# Patient Record
Sex: FEMALE | Race: ASIAN | Employment: FULL TIME | URBAN - METROPOLITAN AREA
[De-identification: names, ages, dates, MRNs, and addresses within clinical notes are randomized per-mention and may not be internally consistent; named-entity substitution may affect disease eponyms.]

---

## 2021-07-01 ENCOUNTER — ANESTHESIA EVENT (OUTPATIENT)
Dept: ENDOSCOPY | Age: 56
End: 2021-07-01
Payer: COMMERCIAL

## 2021-07-01 RX ORDER — FENTANYL CITRATE 50 UG/ML
25 INJECTION, SOLUTION INTRAMUSCULAR; INTRAVENOUS
Status: CANCELLED | OUTPATIENT
Start: 2021-07-01

## 2021-07-01 RX ORDER — OXYCODONE AND ACETAMINOPHEN 5; 325 MG/1; MG/1
1 TABLET ORAL AS NEEDED
Status: CANCELLED | OUTPATIENT
Start: 2021-07-01

## 2021-07-01 RX ORDER — DEXTROSE 50 % IN WATER (D50W) INTRAVENOUS SYRINGE
25-50 AS NEEDED
Status: CANCELLED | OUTPATIENT
Start: 2021-07-01

## 2021-07-01 RX ORDER — HYDROMORPHONE HYDROCHLORIDE 2 MG/ML
0.5 INJECTION, SOLUTION INTRAMUSCULAR; INTRAVENOUS; SUBCUTANEOUS
Status: CANCELLED | OUTPATIENT
Start: 2021-07-01

## 2021-07-01 RX ORDER — INSULIN LISPRO 100 [IU]/ML
INJECTION, SOLUTION INTRAVENOUS; SUBCUTANEOUS ONCE
Status: CANCELLED | OUTPATIENT
Start: 2021-07-01 | End: 2021-07-01

## 2021-07-01 RX ORDER — ONDANSETRON 2 MG/ML
4 INJECTION INTRAMUSCULAR; INTRAVENOUS ONCE
Status: CANCELLED | OUTPATIENT
Start: 2021-07-01 | End: 2021-07-01

## 2021-07-01 RX ORDER — MAGNESIUM SULFATE 100 %
4 CRYSTALS MISCELLANEOUS AS NEEDED
Status: CANCELLED | OUTPATIENT
Start: 2021-07-01

## 2021-07-01 RX ORDER — NALOXONE HYDROCHLORIDE 0.4 MG/ML
0.1 INJECTION, SOLUTION INTRAMUSCULAR; INTRAVENOUS; SUBCUTANEOUS
Status: CANCELLED | OUTPATIENT
Start: 2021-07-01

## 2021-07-01 RX ORDER — SODIUM CHLORIDE, SODIUM LACTATE, POTASSIUM CHLORIDE, CALCIUM CHLORIDE 600; 310; 30; 20 MG/100ML; MG/100ML; MG/100ML; MG/100ML
50 INJECTION, SOLUTION INTRAVENOUS CONTINUOUS
Status: CANCELLED | OUTPATIENT
Start: 2021-07-01

## 2021-07-02 ENCOUNTER — HOSPITAL ENCOUNTER (OUTPATIENT)
Age: 56
Setting detail: OUTPATIENT SURGERY
Discharge: HOME | End: 2021-07-02
Attending: INTERNAL MEDICINE | Admitting: INTERNAL MEDICINE
Payer: COMMERCIAL

## 2021-07-02 ENCOUNTER — ANESTHESIA (OUTPATIENT)
Dept: ENDOSCOPY | Age: 56
End: 2021-07-02
Payer: COMMERCIAL

## 2021-07-02 VITALS
HEIGHT: 63 IN | TEMPERATURE: 97.3 F | SYSTOLIC BLOOD PRESSURE: 109 MMHG | HEART RATE: 70 BPM | DIASTOLIC BLOOD PRESSURE: 54 MMHG | RESPIRATION RATE: 16 BRPM | OXYGEN SATURATION: 98 % | BODY MASS INDEX: 24.8 KG/M2 | WEIGHT: 140 LBS

## 2021-07-02 PROCEDURE — 74011000250 HC RX REV CODE- 250: Performed by: NURSE ANESTHETIST, CERTIFIED REGISTERED

## 2021-07-02 PROCEDURE — 77030021593 HC FCPS BIOP ENDOSC BSC -A: Performed by: INTERNAL MEDICINE

## 2021-07-02 PROCEDURE — 74011250636 HC RX REV CODE- 250/636: Performed by: NURSE ANESTHETIST, CERTIFIED REGISTERED

## 2021-07-02 PROCEDURE — 88305 TISSUE EXAM BY PATHOLOGIST: CPT

## 2021-07-02 PROCEDURE — 74011250636 HC RX REV CODE- 250/636: Performed by: INTERNAL MEDICINE

## 2021-07-02 PROCEDURE — 77030013991 HC SNR POLYP ENDOSC BSC -A: Performed by: INTERNAL MEDICINE

## 2021-07-02 PROCEDURE — 76040000007: Performed by: INTERNAL MEDICINE

## 2021-07-02 PROCEDURE — 76060000032 HC ANESTHESIA 0.5 TO 1 HR: Performed by: INTERNAL MEDICINE

## 2021-07-02 PROCEDURE — 2709999900 HC NON-CHARGEABLE SUPPLY: Performed by: INTERNAL MEDICINE

## 2021-07-02 RX ORDER — EPINEPHRINE 0.1 MG/ML
1 INJECTION INTRACARDIAC; INTRAVENOUS
Status: CANCELLED | OUTPATIENT
Start: 2021-07-02 | End: 2021-07-03

## 2021-07-02 RX ORDER — LEVOTHYROXINE SODIUM 88 UG/1
88 TABLET ORAL
COMMUNITY

## 2021-07-02 RX ORDER — LOSARTAN POTASSIUM 100 MG/1
100 TABLET ORAL DAILY
COMMUNITY

## 2021-07-02 RX ORDER — LIDOCAINE HYDROCHLORIDE 20 MG/ML
INJECTION, SOLUTION EPIDURAL; INFILTRATION; INTRACAUDAL; PERINEURAL AS NEEDED
Status: DISCONTINUED | OUTPATIENT
Start: 2021-07-02 | End: 2021-07-02 | Stop reason: HOSPADM

## 2021-07-02 RX ORDER — PROPOFOL 10 MG/ML
INJECTION, EMULSION INTRAVENOUS AS NEEDED
Status: DISCONTINUED | OUTPATIENT
Start: 2021-07-02 | End: 2021-07-02 | Stop reason: HOSPADM

## 2021-07-02 RX ORDER — DEXTROMETHORPHAN/PSEUDOEPHED 2.5-7.5/.8
1.2 DROPS ORAL
Status: CANCELLED | OUTPATIENT
Start: 2021-07-02

## 2021-07-02 RX ORDER — ATROPINE SULFATE 0.1 MG/ML
0.5 INJECTION INTRAVENOUS
Status: CANCELLED | OUTPATIENT
Start: 2021-07-02 | End: 2021-07-03

## 2021-07-02 RX ORDER — FLUMAZENIL 0.1 MG/ML
0.2 INJECTION INTRAVENOUS
Status: CANCELLED | OUTPATIENT
Start: 2021-07-02 | End: 2021-07-02

## 2021-07-02 RX ORDER — NALOXONE HYDROCHLORIDE 0.4 MG/ML
0.4 INJECTION, SOLUTION INTRAMUSCULAR; INTRAVENOUS; SUBCUTANEOUS
Status: CANCELLED | OUTPATIENT
Start: 2021-07-02 | End: 2021-07-02

## 2021-07-02 RX ORDER — SODIUM CHLORIDE 9 MG/ML
125 INJECTION, SOLUTION INTRAVENOUS CONTINUOUS
Status: DISCONTINUED | OUTPATIENT
Start: 2021-07-02 | End: 2021-07-02 | Stop reason: HOSPADM

## 2021-07-02 RX ORDER — HYDROCHLOROTHIAZIDE 12.5 MG/1
12.5 TABLET ORAL DAILY
COMMUNITY

## 2021-07-02 RX ORDER — SODIUM CHLORIDE 0.9 % (FLUSH) 0.9 %
5-40 SYRINGE (ML) INJECTION AS NEEDED
Status: CANCELLED | OUTPATIENT
Start: 2021-07-02

## 2021-07-02 RX ORDER — SODIUM CHLORIDE 0.9 % (FLUSH) 0.9 %
5-40 SYRINGE (ML) INJECTION EVERY 8 HOURS
Status: CANCELLED | OUTPATIENT
Start: 2021-07-02

## 2021-07-02 RX ADMIN — PROPOFOL 20 MG: 10 INJECTION, EMULSION INTRAVENOUS at 11:51

## 2021-07-02 RX ADMIN — PROPOFOL 20 MG: 10 INJECTION, EMULSION INTRAVENOUS at 11:45

## 2021-07-02 RX ADMIN — PROPOFOL 20 MG: 10 INJECTION, EMULSION INTRAVENOUS at 11:46

## 2021-07-02 RX ADMIN — PROPOFOL 70 MG: 10 INJECTION, EMULSION INTRAVENOUS at 11:43

## 2021-07-02 RX ADMIN — PROPOFOL 20 MG: 10 INJECTION, EMULSION INTRAVENOUS at 11:47

## 2021-07-02 RX ADMIN — SODIUM CHLORIDE 125 ML/HR: 900 INJECTION, SOLUTION INTRAVENOUS at 11:12

## 2021-07-02 RX ADMIN — PROPOFOL 20 MG: 10 INJECTION, EMULSION INTRAVENOUS at 11:50

## 2021-07-02 RX ADMIN — PROPOFOL 30 MG: 10 INJECTION, EMULSION INTRAVENOUS at 11:44

## 2021-07-02 RX ADMIN — LIDOCAINE HYDROCHLORIDE 80 MG: 20 INJECTION, SOLUTION INTRAVENOUS at 11:43

## 2021-07-02 RX ADMIN — PROPOFOL 20 MG: 10 INJECTION, EMULSION INTRAVENOUS at 11:49

## 2021-07-02 RX ADMIN — PROPOFOL 20 MG: 10 INJECTION, EMULSION INTRAVENOUS at 11:56

## 2021-07-02 RX ADMIN — PROPOFOL 20 MG: 10 INJECTION, EMULSION INTRAVENOUS at 11:53

## 2021-07-02 NOTE — ANESTHESIA PREPROCEDURE EVALUATION
Relevant Problems   No relevant active problems       Anesthetic History   No history of anesthetic complications            Review of Systems / Medical History  Patient summary reviewed, nursing notes reviewed and pertinent labs reviewed    Pulmonary  Within defined limits                 Neuro/Psych   Within defined limits           Cardiovascular    Hypertension                   GI/Hepatic/Renal                Endo/Other      Hypothyroidism       Other Findings              Physical Exam    Airway  Mallampati: II  TM Distance: 4 - 6 cm  Neck ROM: normal range of motion   Mouth opening: Normal     Cardiovascular  Regular rate and rhythm,  S1 and S2 normal,  no murmur, click, rub, or gallop             Dental  No notable dental hx       Pulmonary  Breath sounds clear to auscultation               Abdominal  GI exam deferred       Other Findings            Anesthetic Plan    ASA: 2  Anesthesia type: MAC          Induction: Intravenous  Anesthetic plan and risks discussed with: Patient

## 2021-07-02 NOTE — ANESTHESIA POSTPROCEDURE EVALUATION
Post-Anesthesia Evaluation and Assessment    Cardiovascular Function/Vital Signs  Visit Vitals  BP (!) 99/54   Pulse 70   Temp 36.3 °C (97.3 °F)   Resp 16   Ht 5' 3\" (1.6 m)   Wt 63.5 kg (140 lb)   SpO2 98%   Breastfeeding No   BMI 24.80 kg/m²       Patient is status post Procedure(s):  EGD  COLONOSCOPY; POLYPECTOMY. Nausea/Vomiting: Controlled. Postoperative hydration reviewed and adequate. Pain:  Pain Scale 1: Numeric (0 - 10) (07/02/21 1226)  Pain Intensity 1: 0 (07/02/21 1226)   Managed. Neurological Status: At baseline. Mental Status and Level of Consciousness: Baseline and stable. Pulmonary Status:   O2 Device: None (Room air) (07/02/21 1228)   Adequate oxygenation and airway patent. Complications related to anesthesia: None    Post-anesthesia assessment completed. No concerns. Patient has met all discharge requirements.     Signed By: Donna Moore MD

## 2021-07-02 NOTE — PROCEDURES
Esophagogastroduodenoscopy Procedure Note    Shruthi Bearden  [id number]      Indications: New-onset dyspepsia and early satiety    Anesthesia/Sedation: MAC anesthesia Propofol    Assistants: Endoscopy Technician-1: Salud Bee CNA  Endoscopy RN-1: Lana Palmer RN    Pre-Procedure Exam:  Airway: clear   Heart: normal S1and S2    Lungs: clear bilateral  Abdomen: soft, nontender, bowel sounds present and normal in all quadrants   Mental Status: awake, alert, and oriented to person, place, and time      Procedure in Detail:  Informed consent was obtained for the procedure, including conscious sedation. Risks of pancreatitis, infection, perforation, hemorrhage, adverse drug reaction, and aspiration were discussed. The patient was placed in the left lateral decubitus position. Based on the pre-procedure assessment, including review of the patient's medical history, medications, allergies, and review of systems, he had been deemed to be an appropriate candidate for moderate sedation; he was therefore sedated with the medications listed above. He was monitored continuously with electrocardiogram tracing, pulse oximetry, blood pressure monitoring, and direct observation. The [id number] gastroscope was inserted into the mouth and advanced under direct vision to third portion of the duodenum. A careful inspection was made as the gastroscope was withdrawn, including a retroflexed view of the proximal stomach; findings and interventions are described below. Appropriate photodocumentation was obtained. Findings:   OROPHARYNX: Cords and pyriform recesses normal.   ESOPHAGUS: The esophagus is normal. The proximal, mid, and distal portions are normal. The Z-Line is intact. STOMACH: The fundus on antegrade and retroflex views is normal. The body, antrum, and pylorus are normal.   DUODENUM: The bulb and second portions are normal.    Therapies:    none    Specimens: No specimens were collected. Complications:   None; patient tolerated the procedure well. EBL:  None    No prosthetic devices, grafts, tissues, transplant or devices implanted. Attending Attestation:  I performed the procedure. Recommendations:  - Follow up with me. Signed by: Shiva Ordonez MD                     7/2/2021  Colonoscopy Procedure Note    Indications: Routine screening    Anesthesia/Sedation: MAC anesthesia Propofol    Pre-Procedure Exam:  Airway: clear   Heart: normal S1and S2    Lungs: clear bilateral  Abdomen: soft, nontender, bowel sounds present and normal in all quadrants   Mental Status: awake, alert, and oriented to person, place, and time      Procedure in Detail:  Informed consent was obtained for the procedure, including sedation. Risks of perforation, hemorrhage, adverse drug reaction, and aspiration were discussed. The patient was placed in the left lateral decubitus position. Based on the pre-procedure assessment, including review of the patient's medical history, medications, allergies, and review of systems, she had been deemed to be an appropriate candidate for moderate sedation; she was therefore sedated with the medications listed above. The patient was monitored continuously with ECG tracing, pulse oximetry, blood pressure monitoring, and direct observations. A rectal examination was performed. The [id number] was inserted into the rectum and advanced under direct vision to the ileum, which was identified by ileal intubation. The quality of the colonic preparation was excellent. A careful inspection was made as the colonoscope was withdrawn, including a retroflexed view of the rectum; findings and interventions are described below. Appropriate photodocumentation was obtained. ANUS: Anal exam reveals no masses or hemorrhoids, sphincter tone is normal.   RECTUM: Rectal exam reveals no masses or hemorrhoids.    SIGMOID COLON: The mucosa is normal with good vascular pattern and without ulcers, diverticula, and polyps. DESCENDING COLON: The mucosa is normal with good vascular pattern and without ulcers, diverticula, and polyps. SPLENIC FLEXURE: The splenic flexure is normal.   TRANSVERSE COLON: The mucosa is normal with good vascular pattern and without ulcers, diverticula, and polyps. HEPATIC FLEXURE: The hepatic flexure is normal.   ASCENDING COLON: The mucosa is normal with good vascular pattern and without ulcers, diverticula. Two 5mm polyps removed with cold snare   CECUM: The appendiceal orifice appears normal. The ileocecal valve appears normal.   TERMINAL ILEUM: The terminal ileum was not entered. Specimens: Specimens were collected and sent to pathology. EBL: None    No prosthetic devices, grafts, tissues, transplant or devices implanted. Withdrawal Time: 11 min    Complications: None; patient tolerated the procedure well. Attending Attestation: I performed the procedure. Recommendations:   - Await pathology.     Signed By: Kerrie Canales MD                      7/2/2021

## 2021-07-02 NOTE — H&P
Gastroenterology [address] [place] [address]  Tel: [phone]  Fax: [phone]    Patient: Ute Mackey   YOB: 1965   Birth Sex: Female      Current Gender: Female      Date:  06/11/2021 10:20 AM    Historian:   self   Visit Type:  Office Visit           Completed Orders (This Visit)  Order Details Reason Side Interpretation Result Initial Treatment Date Region   Weight monitoring          Dietary management education, guidance, and counseling            Assessment/Plan  # Detail Type Description    1. Assessment GERD w/o esophagitis (K21.9). Patient Plan This is a pleasant 55 y/o Lacy female who has intermittent heartburn for which she takes Tums and Pepcid. However she over the past 5 months started having early satiety. She states she feels food all the time. Can not eat the amount she used to eat and if she takes one bite more she will feel like she is going to explode. Assoc w/burping. denies family h/o gastric cancer. r/o gastric cancer, h.pylori, GOO, other    EGD to eval. The risks, benefits, adverse reactions were discussed in detail today with the patient. This includes, but is not limited to, the risk of bleeding, infections, perforation, death, missed lesions, reactions to anesthesia/sedation, other. They understand and agree to undergo the procedure. undiagnosed new problem with uncertain prognosis  labs reviewed   previous notes from [facility] reviewed  tests  ordered. Plan Orders Further diagnostic evaluations ordered today include(s) UPPER GI ENDOSCOPY, DIAGNOSIS to be performed. 2. Assessment Early satiety (R68.81). Patient Plan see above         3. Assessment Encounter for screening for malignant neoplasm of colon (Z12.11). Patient Plan colo years ago. no report available    1st colon cancer screening.   no fam h/o colon cancer   The risks, benefits, adverse reactions were discussed in detail today with the patient. This includes, but is not limited to, the risk of bleeding, infections, perforation, death, missed lesions, reactions to anesthesia/sedation, other. They understand and agree to undergo the procedure. Colonoscopy with MAC, miralax solution. Plan Orders Further diagnostic evaluations ordered today include(s) DIAGNOSTIC COLONOSCOPY to be performed. 4. Assessment Elevation of levels of liver transaminase levels (R74.01). Patient Plan 6/21 ALT 83   4/2016 ALT 72, alk phos 118H    Will check hep B/C, irons studies, ASMA, AMA  RUQ US  f/u after studies to review    Plan Orders Actin (Smooth Muscle) Antibody & Mitochondrial (M2) Antibody to be performed. , Ferritin, Serum to be performed., HBsAg Screen to be performed., HCV Antibody to be performed., Hep B Core Ab, Tot to be performed., Hep B Surface Ab to be performed. and Iron and TIBC to be performed. Further diagnostic evaluations ordered today include(s) US , ABDOM, COMPLETE to be performed. 5. Assessment Left lower quadrant pain (R10.32). Patient Plan chronic LLQ abdominal pain, has had numerous US, worse with exercise, sounds musculoskeletal         6. Assessment Body mass index (BMI) 25.0-25.9, adult (C75.21). Plan Orders Today's instructions / counseling include(s) Dietary management education, guidance, and counseling. Weight monitoring              This 54year old  patient was referred by [facility]. This 54year old female presents for Colon Cancer Screening and GERD. History of Present Illness  1. Colon Cancer Screening   Prior screening:  colonoscopy. Denies risk factors. There are no associated symptoms. There are no pertinent negatives. Additional information: No family history of colon cancer and No family history of Crohn's/colitis. 2.  GERD   The severity is 6. The problem is not changing. The patient reports heartburn. It occurs randomly. Context: treatment with PPIs.   Denies aggravating factors. Denies relieving factors. There are no associated symptoms. There are no pertinent negatives. Additional information: Patient stated that she has increased burping as well. Problem List  Problem List reviewed. Problem Description Onset Date Chronic Clinical Status Notes   Dyspareunia 10/02/2013 Y     Hyperlipidemia 2011 Y     Hypothyroidism 2011 Y  post ablation   Benign essential hypertension 2011 Y       Past Medical/Surgical History   (Detailed)  Disease/disorder Onset Date Management Date Comments   dyspareunia  dx lap 2007    adenomyosis       Hypertension  Drug therapy     Thyroid disease  radioablation         Gynecologic History  Patient is perimenopausal.     Obstetric History  Not currently pregnant. Family History   (Detailed)    Relationship Family Member Name  Age at Death Condition Onset Age Cause of Death   Brother    Hypertension  N   Father  N  Coronary artery disease  N   Father  N  Stroke  N   Father  N  Hypertension  N   Father  N  Myocardial infarction  N   Mother    High cholesterol  N   Mother    Hypertension  N     Social History  (Detailed)  Tobacco use reviewed. Preferred language is [place]. Education/Employment/Occupation  Employment History Status Retired Restrictions   self employeed         Marital Status/Family/Social Support  Marital status:      Tobacco use status: Never smoked tobacco.    Smoking status: Never smoker. Tobacco Screening  Patient has never used tobacco. Patient has not used tobacco in the last 30 days. Patient has not used smokeless tobacco in the last 30 days.     Smoking Status  Type Smoking Status Usage Per Day Years Used Pack Years Total Pack Years    Never smoker         Tobacco Cessation Information  Date Counseled By Order Status Description Code Tobacco Cessation Information   10/03/2013 Justyna Baptiste Tobacco cessation counseling completed   Tobacco cessation counseling Vaping Use  Screened for vaping? Yes    Alcohol  There is no history of alcohol use. Caffeine  The patient uses caffeine: coffee - 3 cups a day. Lifestyle  Sedentary activity level. Medications (active prior to today)  Medication Instructions Start Date Stop Date Refilled Elsewhere   coenzyme Q10 200 mg capsule take 1 by Oral route qd 02/23/2021   N   biotin 5,000 mcg disintegrating tablet 1 po qd 02/23/2021   N   Synthroid 88 mcg tablet take 1 tablet by oral route  every day 03/08/2021 03/08/2021 N   Vitamin D3 50 mcg (2,000 unit) tablet take 1 by Oral route qd 03/15/2021   N   vitamin K2 100 mcg capsule  03/15/2021   N   losartan 100 mg tablet take 1 tablet by oral route  every day 03/15/2021  03/15/2021 N   hydrochlorothiazide 12.5 mg capsule take 1 (12.5MG)  by oral route  every day 03/15/2021  03/15/2021 N   rosuvastatin 5 mg tablet take 1 by Oral route qd 03/15/2021   N     Patient Status   Completed with information received for patient transitioning into care. Completed with information received for patient in a summary of care record. Medication Reconciliation  Medications reconciled today.     Medication Reviewed  Adherence Medication Name Sig Desc Elsewhere Status   taking as directed coenzyme Q10 200 mg capsule take 1 by Oral route qd N Verified   taking as directed biotin 5,000 mcg disintegrating tablet 1 po qd N Verified   taking as directed Synthroid 88 mcg tablet take 1 tablet by oral route  every day N Verified   taking as directed Vitamin D3 50 mcg (2,000 unit) tablet take 1 by Oral route qd N Verified   taking as directed vitamin K2 100 mcg capsule  N Verified   taking as directed losartan 100 mg tablet take 1 tablet by oral route  every day N Verified   taking as directed hydrochlorothiazide 12.5 mg capsule take 1 (12.5MG)  by oral route  every day N Verified   taking as directed rosuvastatin 5 mg tablet take 1 by Oral route qd N Verified     Medications (Added, Continued or Stopped today)  Start Date Medication Directions PRN Status PRN Reason Instruction Stop Date   02/23/2021 biotin 5,000 mcg disintegrating tablet 1 po qd N      02/23/2021 coenzyme Q10 200 mg capsule take 1 by Oral route qd N      03/15/2021 hydrochlorothiazide 12.5 mg capsule take 1 (12.5MG)  by oral route  every day N      03/15/2021 losartan 100 mg tablet take 1 tablet by oral route  every day N      03/15/2021 rosuvastatin 5 mg tablet take 1 by Oral route qd N      03/08/2021 Synthroid 88 mcg tablet take 1 tablet by oral route  every day N      03/15/2021 Vitamin D3 50 mcg (2,000 unit) tablet take 1 by Oral route qd N      03/15/2021 vitamin K2 100 mcg capsule  N        Allergies  Ingredient Reaction (Severity) Medication Name Comment   NO KNOWN ALLERGIES            Orders  Status Lab Order Time Frame Comments   ordered CT HEAD/BRAIN W/O DYE     ordered US EXAM, BREAST(S) Bilateral     ordered Referral: Mae Biswas MD. OBGYN.  54 y/o female with enlarged uterus and chronic pelvic pain,  is referred for eval and opinion on managment. ordered Bilateral, Diagnostic mammography digital Bilateral     scheduled Referral: Dermatology. 54 y/o female is referred for evaluation skin nevus on chest and left forearm   scheduled Referral: Podiatry.   54 y/o female is referred for evaluation of right foot bunion   specimen obtained KOH     specimen obtained Dermatophyte Only, Culture     specimen obtained Pathology     specimen obtained BMP     specimen obtained CBC with Diff     specimen obtained Hepatic Panel     specimen obtained TSH     specimen obtained Lipid Measured LDL     obtained * Screening mammography digital     obtained Upper Resp Culture     obtained PAP Lb reflex HPV ASCU     obtained Pathology     ordered * X-RAY EXAM OF LOWER SPINE, 2 Or 3 Views     obtained * Screening mammography digital     obtained PAP IG reflex HPV ASCU     ordered BMP     ordered CBC with Diff     ordered Hepatic Panel     ordered TSH     ordered Lipid Measured LDL     result received TSH     result received Basic Metabolic Panel (8)     result received CBC With Differential/Platelet     result received Hepatic Function Panel (7)     result received Lipid Panel     scheduled * Screening mammography digital     result received Screening mammography digital     result received Hepatic Function Panel (7)     result received US, PELVIC, COMPLETE     ordered UA In Office No Micro     result received Gyn Pap Test-Age -based Guideline for Cervical Cancer (Aptima) and STDs     result received TSH     result received Vitamin D, 25-Hydroxy     result received ALT (SGPT)     result received Lipid Panel With LDL/HDL Ratio     result received Basic Metabolic Panel (8)     result received Hgb A1c with eAG Estimation     result received Screening Mammography Digital     completed Weight monitoring     completed Dietary management education, guidance, and counseling     ordered Hep B Core Ab, Tot     ordered HBsAg Screen     ordered Hep B Surface Ab     ordered Actin (Smooth Muscle) Antibody & Mitochondrial (M2) Antibody     ordered Ferritin, Serum     ordered Iron and TIBC     ordered HCV Antibody     ordered US , ABDOM, COMPLETE     ordered UPPER GI ENDOSCOPY, DIAGNOSIS     ordered DIAGNOSTIC COLONOSCOPY         Review of Systems  System Neg/Pos Details   Constitutional Negative Chills, Fever and Weight loss. ENMT Negative Ear infections and Nasal congestion. Respiratory Negative Chronic cough, Dyspnea and Wheezing. Cardio Negative Chest pain. GI Positive Heartburn. GI Negative Abdominal pain, Change in bowel habits, Constipation, Decreased appetite, Diarrhea, Dysphagia, Hematemesis, Hematochezia, Melena, Nausea, Reflux and Vomiting.  Negative Dysuria. Endocrine Negative Cold intolerance and Heat intolerance. Neuro Negative Dizziness and Headache. Psych Negative Anxiety, Depression and Increased stress.    MS Negative Back pain and Joint pain.   Hema/Lymph Negative Easy bleeding and Easy bruising. Vital Signs   Gynecologic History  Patient is perimenopausal.      Height  Time ft in cm Last Measured Height Position   10:40 AM 5.0 3.50 161.29 05/18/2015 Standing     Weight/BSA/BMI  Time lb oz kg Context BMI kg/m2 BSA m2   10:40 .00  65.771  25.28      Blood Pressure  Time BP mm/Hg Position Side Site Method Cuff Size   10:40 /74          Temperature/Pulse/Respiration  Time Temp F Temp C Temp Site Pulse/min Pattern Resp/ min   10:40 AM    66 regular      Pulse Oximetry/FIO2  Time Pulse Ox (Rest %) Pulse Ox (Amb %) O2 Sat O2 L/Min Timing FiO2 % L/min Delivery Method Finger Probe   10:40 AM 99  RA           Measured by  Time Measured by   10:40 AM Monika Irvin     Physical  Exam  Exam Findings Details   Constitutional Normal No acute distress. Well nourished. Well developed. Eyes Normal General - Right: Normal, Left: Normal. Sclera - Right: Normal, Left: Normal. Pupil - Right: Normal, Left: Normal. Iris - Right: Normal, Left: Normal.   Ears Normal Inspection - Right: Normal, Left: Normal.   Nose/Mouth/Throat Normal External nose - Normal. Lips/teeth/gums - Normal.   Neck Exam Normal Inspection - Normal. Thyroid gland - Normal. Range of motion - Normal.   Respiratory Normal Inspection - Normal. Auscultation - Normal. Effort - Normal.   Cardiovascular Normal Heart rate - Regular rate. Rhythm - Regular. Murmurs - None. Abdomen Normal Inspection - Normal. Appliance(s) - None. Auscultation - Normal. Anterior palpation - Normal, No guarding, No rebound. No abdominal tenderness. No hepatic enlargement. No hernia. No ascites. Davis's sign - Normal.   Skin * Body areas examined - Head/face, Neck, Abdomen. Skin Normal Inspection - Normal. Nails - Normal.   Musculoskeletal Normal Visual overview of all four extremities is normal. Gait - Normal.   Extremity Normal No Edema. Clubbing - Absent.    Neurological Normal Level of consciousness - Normal. Memory - Normal. Cranial nerves - Cranial nerves II through XII grossly intact. Sensory - Normal. Balance & gait - Normal. Coordination - Normal.   Psychiatric Normal Orientation - Oriented to time, place, person & situation. No agitation. Appropriate mood and affect. Behavior is appropriate for age. Immunizations Entered by History  Date Immunization   11/26/2012 11:58:00 AM Flu (3 yrs or older)       Active Patient Care Team Members  Name Contact Agency Type Support Role Relationship Active Date Inactive Date Specialty   Glenn Stout   encounter provider       [facility]   Emergency Contact Spouse      Leif Engel   encounter provider    Gastroenterology   Mare Gaines   Patient provider PCP   [facility]    Spouse          Provider:    Mynor Davis MD 06/11/2021 10:55 AM     Document generated by: Mynor Davis 06/11/2021    CC Providers    [address]  [facility] [address]   [address]    Mare Gaines     ----------------------------------------------------------------------------------------------------------------------------------------------------------------------      Electronically signed by Mynor Davis MD on 06/11/2021 12:37 PM    Pt seen and examined.   No interval change

## 2021-07-02 NOTE — DISCHARGE INSTRUCTIONS
Gina Campos  [id number]  1965    COLON / EGD DISCHARGE INSTRUCTIONS    Discomfort:  Sore throat- throat lozenges or warm salt water gargle  Redness at IV site- apply warm compress to area; if redness or soreness persist- contact your physician  There may be a slight amount of blood passed from the rectum  Gaseous discomfort- walking, belching will help relieve any discomfort  You should note operate a vehicle for 12 hours  You should not engage in an occupation involving machinery or appliances for rest of today  You may note drink alcoholic beverages for at least 12 hours  Avoid making any critical decisions for at least 24 hour  DIET:   Regular diet. - however -  remember your colon is empty and a heavy meal will produce gas. Avoid these foods:  vegetables, fried / greasy foods, carbonated drinks for today     ACTIVITY:  You may resume your normal daily activities it is recommended that you spend the remainder of the day resting -  avoid any strenuous activity. CALL M.D. ANY SIGN OF:   Increasing pain, nausea, vomiting  Abdominal distension (swelling)  New increased bleeding (oral or rectal)  Fever (chills)  Pain in chest area  Bloody discharge from nose or mouth  Shortness of breath    Follow-up Instructions: Follow up in clinic on 7/12 at 0900 with NP Karlo Sue at [facility]  Will call with polyp pathology results and when next colonoscopy due                      Gina Campos  [id number]  1965        DISCHARGE SUMMARY from Nurse    The following personal items collected during your admission are returned to you:   Dental Appliance: Dental Appliances: None  Vision: Visual Aid: None  Hearing Aid:    Jewelry:    Clothing:    Other Valuables:    Valuables sent to safe:      PATIENT INSTRUCTIONS:    Take Home Medications:  {Medication reconciliation information is now added to the patient's AVS automatically when it is printed.   There is no need to use this SmartLink in discharge instructions.   Highlight this text and delete it to clear this message}

## 2021-07-02 NOTE — PERIOP NOTES
Reviewed discharge plan of care with family member. Written instructions provided as well. Written instructions provided as well.  They also spoke with Dr Rossana Qiu

## (undated) DEVICE — MOUTHPIECE ENDOSCP 20X27MM --

## (undated) DEVICE — FORCEPS BX CAP 240CM L RAD JAW 4

## (undated) DEVICE — Device

## (undated) DEVICE — AIRLIFE™ NASAL OXYGEN CANNULA CURVED, FLARED TIP WITH 14 FOOT (4.3 M) CRUSH-RESISTANT TUBING, OVER-THE-EAR STYLE: Brand: AIRLIFE™

## (undated) DEVICE — SINGLE PORT MANIFOLD: Brand: NEPTUNE 2

## (undated) DEVICE — KENDALL RADIOLUCENT FOAM MONITORING ELECTRODE RECTANGULAR SHAPE: Brand: KENDALL

## (undated) DEVICE — BRUSH CYTO L240CM DIA2.3MM GI COLONOSCOPY 3 RNG HNDL RADPQ

## (undated) DEVICE — MAJ-1414 SINGLE USE ADPATER BIOPSY VALV: Brand: SINGLE USE ADAPTOR BIOPSY VALVE

## (undated) DEVICE — SET ADMIN 16ML TBNG L100IN 2 Y INJ SITE IV PIGGY BK DISP

## (undated) DEVICE — NDL PRT INJ NSAF BLNT 18GX1.5 --

## (undated) DEVICE — TRAP SPEC COLL POLYP POLYSTYR --

## (undated) DEVICE — 4-PORT MANIFOLD: Brand: NEPTUNE 2

## (undated) DEVICE — ALL PURPOSE SPONGES,NON-WOVEN, 4 PLY: Brand: CURITY

## (undated) DEVICE — CATH IV SAFE STR 22GX1IN BLU -- PROTECTIV PLUS

## (undated) DEVICE — ENDO CARRY-ON PROCEDURE KIT INCLUDES ENZYMATIC SPONGE, GAUZE, BIOHAZARD LABEL, TRAY, LUBRICANT, DIRTY SCOPE LABEL, WATER LABEL, TRAY, DRAWSTRING PAD, AND DEFENDO 4-PIECE KIT.: Brand: ENDO CARRY-ON PROCEDURE KIT

## (undated) DEVICE — TRNQT TEXT 1X18IN BLU LF DISP -- CONVERT TO ITEM 362165

## (undated) DEVICE — EJECTOR SALIVA 6 IN FLX CLR

## (undated) DEVICE — SYR 50ML SLIP TIP NSAF LF STRL --

## (undated) DEVICE — SNARE POLYP SM W13MMXL240CM SHTH DIA2.4MM OVL FLX DISP

## (undated) DEVICE — SYR 3ML LL TIP 1/10ML GRAD --